# Patient Record
(demographics unavailable — no encounter records)

---

## 2025-02-21 NOTE — REVIEW OF SYSTEMS
[Sneezing] : sneezing [Seasonal Allergies] : seasonal allergies [Post Nasal Drip] : post nasal drip [Sinus Pain] : sinus pain [Sinus Pressure] : sinus pressure [Dry Eyes] : dry eyes [Eyes Itch] : itching of the eyes [Anxiety] : anxiety [Negative] : Heme/Lymph [Patient Intake Form Reviewed] : Patient intake form was reviewed [FreeTextEntry3] : watery  [de-identified] : headache

## 2025-02-21 NOTE — REVIEW OF SYSTEMS
[Sneezing] : sneezing [Seasonal Allergies] : seasonal allergies [Post Nasal Drip] : post nasal drip [Sinus Pain] : sinus pain [Sinus Pressure] : sinus pressure [Dry Eyes] : dry eyes [Eyes Itch] : itching of the eyes [Anxiety] : anxiety [Negative] : Heme/Lymph [Patient Intake Form Reviewed] : Patient intake form was reviewed [FreeTextEntry3] : watery  [de-identified] : headache

## 2025-02-21 NOTE — HISTORY OF PRESENT ILLNESS
[de-identified] : RECURRENT ALLERGIC RHINITS  MEDICAL HX REVIEWED FRONTAL PRESSURE FEELING AT TIMES MEDICATION SOMETIME HELPS

## 2025-02-21 NOTE — HISTORY OF PRESENT ILLNESS
[de-identified] : RECURRENT ALLERGIC RHINITS  MEDICAL HX REVIEWED FRONTAL PRESSURE FEELING AT TIMES MEDICATION SOMETIME HELPS

## 2025-02-21 NOTE — ASSESSMENT
[FreeTextEntry1] : RHINITIS DISCUSSED ALLERGY DISCUSSED EUCALYPTUS HUMIDIFIER DEVIATED SEPTUM DISCUSSED F/U ONE MONTH PRN IF SYMPTOMS PERSIST WILL CONSIDER CT

## 2025-02-21 NOTE — PHYSICAL EXAM
[] : septum deviated to the right [de-identified] : MILD IRRITATION [Normal] : mucosa is normal [Midline] : trachea located in midline position

## 2025-02-21 NOTE — PHYSICAL EXAM
[] : septum deviated to the right [de-identified] : MILD IRRITATION [Normal] : mucosa is normal [Midline] : trachea located in midline position